# Patient Record
Sex: FEMALE | Race: OTHER | HISPANIC OR LATINO
[De-identification: names, ages, dates, MRNs, and addresses within clinical notes are randomized per-mention and may not be internally consistent; named-entity substitution may affect disease eponyms.]

---

## 2023-07-05 ENCOUNTER — RX ONLY (OUTPATIENT)
Age: 43
Setting detail: RX ONLY
End: 2023-07-05

## 2024-02-29 ENCOUNTER — APPOINTMENT (RX ONLY)
Dept: URBAN - NONMETROPOLITAN AREA CLINIC 12 | Facility: CLINIC | Age: 44
Setting detail: DERMATOLOGY
End: 2024-02-29

## 2024-02-29 DIAGNOSIS — I87.2 VENOUS INSUFFICIENCY (CHRONIC) (PERIPHERAL): ICD-10-CM

## 2024-02-29 PROCEDURE — 99213 OFFICE O/P EST LOW 20 MIN: CPT

## 2024-02-29 PROCEDURE — ? MEDICAL CONSULTATION: VENOUS DISEASE

## 2024-02-29 NOTE — PROCEDURE: MEDICAL CONSULTATION: VENOUS DISEASE
Left Leg: Peripheral Vascular Disease?: No
Left Leg Pain: 2- Daily, moderate activity limitation, occasional analgesics
Right Leg Varicose Veins: 2- Multiple: GS varicose veins confined to calf or thigh
Left Leg Induration: 0- None
Left Leg Circumference: medium
Include Vcss In The Note?: Yes
Right Leg Venous Edema: 1- Evening ankle edema only
Right Dorsalis Pedis Pulse: 2 (Easily palpable)
Right Leg Venous Hyperpigmentation: 0- None or focal low intensity (tan)
Follow Up Instructions:: Preventive strategies include weight loss through diet and exercise and toning leg muscles. A venous fact sheet was given, which reviews venous anatomy/pathophysiology and treatment options. The pathophysiology of venous disease and potential treatment options were discussed in detail, especially the non-FDA status of foam sclerotherapy with its risks benefits and alternatives. The patient's questions were answered in full.
Right Leg Compression Therapy: 3- Full compliance: stockings and elevation
Detail Level: Detailed

## 2024-02-29 NOTE — HPI: VEIN EVALUATION
[id number]
How Severe Is/Are Your Symptoms?: moderate
Is This A New Presentation, Or A Follow-Up?: Lower Extremity Pain and Swelling
Family History Of Vein Disease (Include Family Member And Type Of Vein Disease):: Grandparents

## 2024-03-14 ENCOUNTER — APPOINTMENT (RX ONLY)
Dept: URBAN - NONMETROPOLITAN AREA CLINIC 12 | Facility: CLINIC | Age: 44
Setting detail: DERMATOLOGY
End: 2024-03-14

## 2024-03-14 DIAGNOSIS — I87.2 VENOUS INSUFFICIENCY (CHRONIC) (PERIPHERAL): ICD-10-CM

## 2024-03-14 PROCEDURE — 93970 EXTREMITY STUDY: CPT

## 2024-03-14 PROCEDURE — ? LOWER EXTREMITY DOPPLER US

## 2024-03-14 NOTE — PROCEDURE: LOWER EXTREMITY DOPPLER US
Recommend Sclerotherapy With Ultrasound Guidance On Right Side: No
Continue Conservative Therapy Text: Continue conservative treatment (such as compression stockings, OTC analgesics, and exercise) and consider intervention if no change or worsening symptoms to varicosities.
Detail Level: Detailed
Left Intraluminal Thrombus- Yes: The left deep veins were imaged from the level of the common femoral vein to the posterior tibial veins. There was evidence of intraluminal thrombus as noted above.
Right Intraluminal Thrombus- No: The right deep veins were imaged from the level of the common femoral vein to the posterior tibial veins. All deep veins demonstrated compressibility without evidence of intraluminal thrombus.
Continue Post-Procedural Therapy: Yes
Comments: See attachment.
Left Intraluminal Thrombus- No: The left deep veins were imaged from the level of the common femoral vein to the posterior tibial veins. All deep veins demonstrated compressibility without evidence of intraluminal thrombus.
Use - 'see Attached Documentation' Verbiage?: Yes - Will Include Text Below and Will Remove Other Portions of the Note
Size Options: Use Range
Right Intraluminal Thrombus- Yes: The right deep veins were imaged from the level of the common femoral vein to the posterior tibial veins. There was evidence of intraluminal thrombus as noted above.
See Attached Documentation Text: Please refer to the attached ultrasound documentation for complete details of the procedure and the venous findings.

## 2024-04-11 ENCOUNTER — APPOINTMENT (RX ONLY)
Dept: URBAN - NONMETROPOLITAN AREA CLINIC 12 | Facility: CLINIC | Age: 44
Setting detail: DERMATOLOGY
End: 2024-04-11

## 2024-04-11 DIAGNOSIS — I87.2 VENOUS INSUFFICIENCY (CHRONIC) (PERIPHERAL): ICD-10-CM

## 2024-04-11 PROCEDURE — ? VEIN TREATMENT PLAN

## 2024-04-11 PROCEDURE — 99213 OFFICE O/P EST LOW 20 MIN: CPT

## 2024-04-11 PROCEDURE — ? MEDICAL CONSULTATION: VENOUS DISEASE

## 2024-04-11 PROCEDURE — ? CEAP CLASSIFICATION

## 2024-04-11 NOTE — PROCEDURE: VEIN TREATMENT PLAN
Symptom Statement (Will Not Render If Left Blank): The patient has signs and symptoms of chronic venous hypertension affecting daily function with US of the lower extremities demonstrating venous insufficiency. The patient has failed conservative treatment including avoiding prolonged standing, leg elevation, analgesis, exercise, weight management and graduated compression stockings (20-30mmHg or higher).
Ugs Sessions - Left: 2
Damion Sessions - Left: 1
Detail Level: Simple
Closing Statement (Will Not Render If Left Blank): We discussed all treatment options. Risks and benefits of each procedure were discussed. These include but are not limited to: deep vein thrombosis, pulmonary embolism, paresthesia, phlebitis, infection, bleeding, and visible scarring. After the risks and benefits were reviewed with the patient and all of their questions were answered they decided to move forward with treatment. A patient education booklet was given and pre/post procedure instructions were reviewed with the patient.
Intro Statement (Will Not Render If Left Blank): Extensive discussion and education was undertaken during the office visit regarding pathophysiology, chronicity and long term prognosis of venous disease. We also discussed risk factors for venous hypertension, diagnostic testing and treatment. Our treatment discussion included conservative management and interventional procedure options with an in-depth explanation of the procedures, recommendations and expected follow-up. All questions were answered and no further questions were verbalized by the patient at this time.
Equal and normal pulses (carotid, femoral, dorsalis pedis)

## 2024-07-11 ENCOUNTER — APPOINTMENT (RX ONLY)
Dept: URBAN - NONMETROPOLITAN AREA CLINIC 12 | Facility: CLINIC | Age: 44
Setting detail: DERMATOLOGY
End: 2024-07-11

## 2024-07-11 DIAGNOSIS — I87.2 VENOUS INSUFFICIENCY (CHRONIC) (PERIPHERAL): ICD-10-CM

## 2024-07-11 PROCEDURE — 36475 ENDOVENOUS RF 1ST VEIN: CPT | Mod: 59,LT

## 2024-07-11 PROCEDURE — ? COMPRESSION STOCKING FITTING

## 2024-07-11 PROCEDURE — ? ADDITIONAL NOTES

## 2024-07-11 PROCEDURE — ? ENDOVENOUS ABLATION

## 2024-07-11 ASSESSMENT — LOCATION SIMPLE DESCRIPTION DERM: LOCATION SIMPLE: LEFT CALF

## 2024-07-11 ASSESSMENT — LOCATION DETAILED DESCRIPTION DERM: LOCATION DETAILED: LEFT PROXIMAL CALF

## 2024-07-11 ASSESSMENT — LOCATION ZONE DERM: LOCATION ZONE: LEG

## 2024-07-11 NOTE — PROCEDURE: ENDOVENOUS ABLATION
Medical Necessity Information: LCD Guidelines vary from payer to payer. Please check with your payer's policy to determine medical necessity.
Add Additional Vein Treatment Details: No
Body Location Override (Optional - Billing Will Still Be Based On Selected Body Map Location): mid calf
Number Of Sheaths Used: 1
Detail Level: Zone
Number Of Incisions Per Microphlebectomy: 0
Tumescent Anesthesia Administered By (Optional): Dr. Miranda
Rf Temperature (Include Units): 120 Celsius
Rf Catheter Used?: RF Smart CF7-7-60
Rf Sheaths Used: MARINA Introducer Set
Hemostasis: Pressure
Estimated Blood Loss (Cc): minimal
Medical Necessity Clause: This therapy was medically necessary because the patient has
Show Inventory: Hide

## 2024-07-11 NOTE — PROCEDURE: ADDITIONAL NOTES
Detail Level: Simple
Additional Notes: The image attached to this note does not provide full representation of the evaluation and treatment related to this encounter. Please reference the full patient record for additional information related to diagnosis, interpretation\\nand treatment plan
Render Risk Assessment In Note?: no

## 2024-07-11 NOTE — PROCEDURE: COMPRESSION STOCKING FITTING
Detail Level: Simple
Toe Type: Open
Strength: 20-30 mmHg
Pantihose Type: Part A
Stocking Type: Stockings

## 2024-10-17 ENCOUNTER — APPOINTMENT (RX ONLY)
Dept: URBAN - NONMETROPOLITAN AREA CLINIC 12 | Facility: CLINIC | Age: 44
Setting detail: DERMATOLOGY
End: 2024-10-17

## 2024-10-17 DIAGNOSIS — I87.2 VENOUS INSUFFICIENCY (CHRONIC) (PERIPHERAL): ICD-10-CM | Status: INADEQUATELY CONTROLLED

## 2024-10-17 PROCEDURE — ? MEDICAL CONSULTATION: VENOUS DISEASE

## 2024-10-17 PROCEDURE — 99212 OFFICE O/P EST SF 10 MIN: CPT

## 2024-10-17 NOTE — PROCEDURE: MEDICAL CONSULTATION: VENOUS DISEASE
Left Leg: Peripheral Vascular Disease?: No
Left Leg Pain: 2- Daily, moderate activity limitation, occasional analgesics
Right Leg Varicose Veins: 1- Few, scattered: branch varicose veins
Left Leg Induration: 0- None
Left Leg Circumference: medium
Right Leg Venous Edema: 1- Evening ankle edema only
Right Dorsalis Pedis Pulse: 2 (Easily palpable)
Include Right Vcss In Note: Yes
Right Leg Venous Hyperpigmentation: 0- None or focal low intensity (tan)
Follow Up Instructions:: Preventive strategies include weight loss through diet and exercise and toning leg muscles. A venous fact sheet was given, which reviews venous anatomy/pathophysiology and treatment options. The pathophysiology of venous disease and potential treatment options were discussed in detail, especially the non-FDA status of foam sclerotherapy with its risks benefits and alternatives. The patient's questions were answered in full.
Right Leg Compression Therapy: 1- Intermittent use of stockings
Detail Level: Detailed
Clinical Classification Set 1: C0 - no visible or palpable signs of venous disease
Etiology Set 2: Ec - Congenital
Anatomy Set 1: As - Superficial Veins
Pathophysiology Set 1: Pr - Reflux

## 2024-11-13 ENCOUNTER — APPOINTMENT (RX ONLY)
Dept: URBAN - METROPOLITAN AREA CLINIC 141 | Facility: CLINIC | Age: 44
Setting detail: DERMATOLOGY
End: 2024-11-13

## 2024-11-13 DIAGNOSIS — I87.2 VENOUS INSUFFICIENCY (CHRONIC) (PERIPHERAL): ICD-10-CM

## 2024-11-13 PROCEDURE — ? LOWER EXTREMITY DOPPLER US

## 2024-11-13 PROCEDURE — 93970 EXTREMITY STUDY: CPT

## 2024-11-13 NOTE — PROCEDURE: LOWER EXTREMITY DOPPLER US
Detail Level: Detailed
Recommend Sclerotherapy With Ultrasound Guidance On Left Side: No
Right Intraluminal Thrombus- No: The right deep veins were imaged from the level of the common femoral vein to the posterior tibial veins. All deep veins demonstrated compressibility without evidence of intraluminal thrombus.
Continue Conservative Therapy Text: Continue conservative treatment (such as compression stockings, OTC analgesics, and exercise) and consider intervention if no change or worsening symptoms to varicosities.
Size Options: Use Range
Right Intraluminal Thrombus- Yes: The right deep veins were imaged from the level of the common femoral vein to the posterior tibial veins. There was evidence of intraluminal thrombus as noted above.
Continue Conservative Therapy: Yes
Use - 'see Attached Documentation' Verbiage?: Yes - Will Include Text Below and Will Remove Other Portions of the Note
See Attached Documentation Text: Please refer to the attached ultrasound documentation for complete details of the procedure and the venous findings.
Left Intraluminal Thrombus- No: The left deep veins were imaged from the level of the common femoral vein to the posterior tibial veins. All deep veins demonstrated compressibility without evidence of intraluminal thrombus.
Left Intraluminal Thrombus- Yes: The left deep veins were imaged from the level of the common femoral vein to the posterior tibial veins. There was evidence of intraluminal thrombus as noted above.

## 2024-11-14 ENCOUNTER — APPOINTMENT (RX ONLY)
Dept: URBAN - NONMETROPOLITAN AREA CLINIC 12 | Facility: CLINIC | Age: 44
Setting detail: DERMATOLOGY
End: 2024-11-14

## 2024-11-14 DIAGNOSIS — I87.2 VENOUS INSUFFICIENCY (CHRONIC) (PERIPHERAL): ICD-10-CM

## 2024-11-14 PROCEDURE — 36465 NJX NONCMPND SCLRSNT 1 VEIN: CPT | Mod: LT

## 2024-11-14 PROCEDURE — ? VARITHENA SCLEROTHERAPY

## 2024-11-14 PROCEDURE — ? ADDITIONAL NOTES

## 2024-11-14 ASSESSMENT — LOCATION DETAILED DESCRIPTION DERM: LOCATION DETAILED: LEFT DISTAL PRETIBIAL REGION

## 2024-11-14 ASSESSMENT — LOCATION SIMPLE DESCRIPTION DERM: LOCATION SIMPLE: LEFT PRETIBIAL REGION

## 2024-11-14 ASSESSMENT — LOCATION ZONE DERM: LOCATION ZONE: LEG

## 2024-11-14 NOTE — PROCEDURE: ADDITIONAL NOTES
Additional Notes: The image attached to this note does not provide full representation of the evaluation and treatment related to this encounter. Please reference the full patient record for additional information related to diagnosis, interpretation and treatment plan.
Detail Level: Simple
Render Risk Assessment In Note?: no

## 2024-11-14 NOTE — PROCEDURE: VARITHENA SCLEROTHERAPY
Sclerosant (A): Varithena Foam
Sclerosant Volume (Cc): 4
Post-Care Instructions: Post-operative instructions given verbally and in writing. Instructed to walk for 10 minutes every hour the patient is awake, and wear the compression stocking for 48 hours post procedure and then continue wearing during the day for 1 week. No heavy lifting over 25 lbs and no strenuous exercising. Patient was given phone number to the office to contact with any issues or concerns. Patient verbalized understanding of all instructions. Questions encouraged and answered.
Detail Level: Detailed
Volume Of Varithena Foam Removed From Canister (Cc): 0
Render Post Care In Note: Yes
Sclerosant (A) %: 1
Show Inventory Tab: Hide